# Patient Record
Sex: MALE | Race: ASIAN
[De-identification: names, ages, dates, MRNs, and addresses within clinical notes are randomized per-mention and may not be internally consistent; named-entity substitution may affect disease eponyms.]

---

## 2020-05-19 ENCOUNTER — HOSPITAL ENCOUNTER (OUTPATIENT)
Dept: HOSPITAL 46 - DS | Age: 45
Discharge: HOME | End: 2020-05-19
Attending: SURGERY
Payer: OTHER GOVERNMENT

## 2020-05-19 VITALS — HEIGHT: 65 IN | BODY MASS INDEX: 22.99 KG/M2 | WEIGHT: 138.01 LBS

## 2020-05-19 DIAGNOSIS — K42.9: Primary | ICD-10-CM

## 2020-05-19 DIAGNOSIS — Z79.899: ICD-10-CM

## 2020-05-19 DIAGNOSIS — K45.8: ICD-10-CM

## 2020-05-19 PROCEDURE — 0WQF0ZZ REPAIR ABDOMINAL WALL, OPEN APPROACH: ICD-10-PCS | Performed by: SURGERY

## 2020-05-19 PROCEDURE — C1781 MESH (IMPLANTABLE): HCPCS

## 2020-05-20 NOTE — OR
[facility]
                                    [address], [place]  [address]
_________________________________________________________________________________________
                                                                 Signed   
 
 
DATE OF OPERATION:
05/19/2020
 
SURGEON:
Carolyn Yap MD
 
PREOPERATIVE DIAGNOSIS:
Symptomatic umbilical hernia.
 
POSTOPERATIVE DIAGNOSIS:
Symptomatic umbilical hernia with herniated preperitoneal fat.
 
PROCEDURE:
Repair of umbilical hernia without implantation of mesh.
 
ANESTHESIA:
General endotracheal; Jeff Lyle CRNA and local 20 mL of 0.25% Marcaine with
epinephrine. 
 
INDICATIONS:
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
the periumbilical area.  He is a patient of MARI Pepper in [facility].  A CT
scan was performed under the direction of Mr. Segal, which showed herniation of fat
through an umbilical defect.  Clinical examination is rather nonspecific.  His pain is
episodic and significant when it occurs and located in the umbilical area.  He wishes to
proceed with hernia repair.  He understands the risks of bleeding, infection, and most
importantly failure to cure his symptoms.  He understands this, he wished to proceed. 
 
FINDINGS:
Indeed there was an umbilical defect with herniated preperitoneal fat.  Excision of the
fat was undertaken.  The defect was rather small at most 1.5 cm in size.  The fascial
edges were incised to allow for palpation in the preperitoneal space showing no sign of
other fascial defect.  Repair consisted of transverse reapproximation of the fascia with
interrupted 0 Prolene suture in a vertical mattress configuration.  Although, photos
were taken throughout the procedure, the card used to capture the photos, was unable to
print due to a defect in the card itself and therefore, there are no photos to document
the procedure unfortunately. 
 
DESCRIPTION OF PROCEDURE:
The patient was brought into the operating room, given a general endotracheal
anesthetic.  Preoperative antibiotic Ancef was given.  Sequential compression device
stockings were used and heparin subcutaneously administered.  The abdomen was clipped
 
    Electronically Signed By: CAROLYN YAP MD  05/20/20 9705
_________________________________________________________________________________________
PATIENT NAME:     VUU,LOC ALBA                          
MEDICAL RECORD #: [id number]            OPERATIVE REPORT              
          ACCT #: [id number]  
DATE OF BIRTH:   [birth year]            REPORT #: [id number]      
PHYSICIAN:        CAROLYN YAP MD                 
PCP:              NYASIA SARAVIA MD            
REPORT IS CONFIDENTIAL AND NOT TO BE RELEASED WITHOUT AUTHORIZATION
 
 
                                  [facility]
                                    [address], [place]  [address]
_________________________________________________________________________________________
                                                                 Signed   
 
 
and prepared with a chlorhexidine solution and draped sterilely.  A curvilinear incision
was made on the left side of the umbilicus.  Dissection carried through the dermis
sharply.  Electrocautery was used for hemostasis.  Dissection was carried through the
subcutaneous space, identifying herniated fat.  The fascial edges were easily defined
and the skin and so forth of the umbilicus were freed.  There appeared to be herniated
preperitoneal fat, which was not reducible particularly.  Noting that the fascial edge
was well defined, the defect looked to be about 1.5 cm at most.  The herniated
preperitoneal fat was excised.  The fascial edges were stout and strong.  The fascia was
incised laterally to the left, allowing for placement of index finger in the
preperitoneal space.  This allowed for determination that there were no other fascial
defects in the surrounding area.  There were none. 
 
Repair was deemed most appropriate with reapproximation of the fascia rather than
implantation of Prolene mesh given the small size of the defect.  The fascial layer was
reapproximated transversely with interrupted 0 Prolene suture in a vertical mattress
configuration.  A small defect at the base of the umbilicus skin was noted.  This was
secured with interrupted 2-0 Vicryl.  The depth of the dermis was then secured to the
fascia similarly.  A 20 mL of 0.25% Marcaine with epinephrine injected in the fascial
area and the subcutaneous tissue as well.  Marek's layer was reapproximated with
interrupted 2-0 Vicryl and the skin was closed with running subcuticular 3-0 Vicryl.
Steri-Strips were applied as was a silver sponge dressing with adhesive.  The patient
was extubated "deep" and transferred to the recovery room in good condition having
suffered no complications.  Sponge, needle, and counts reported as correct x3. 
 
 
 
            ________________________________________
            Carolyn Yap MD 
 
 
/MODL
Job #:  [id number]
DD:  05/19/2020 08:38:00
DT:  05/19/2020 09:52:22
 
cc:            MD Carolyn Sifuentes PA
 
 
Copies:  NYASIA SARAVIA MD
 
 
    Electronically Signed By: CAROLYN YAP MD  05/20/20 1146
_________________________________________________________________________________________
PATIENT NAME:     VUU,LOC ALBA                          
MEDICAL RECORD #: [id number]            OPERATIVE REPORT              
          ACCT #: [id number]  
DATE OF BIRTH:   [birth year]            REPORT #: [id number]      
PHYSICIAN:        CAROLYN YAP MD                 
PCP:              NYASIA SARAVIA MD            
REPORT IS CONFIDENTIAL AND NOT TO BE RELEASED WITHOUT AUTHORIZATION
 
 
                                  [address]  [address]
_________________________________________________________________________________________
                                                                 Signed   
 
 
         CAROLYN SEGAL
~
 
 
 
 
 
 
 
 
 
 
 
 
 
 
 
 
 
 
 
 
 
 
 
 
 
 
 
 
 
 
 
 
 
 
 
 
 
 
 
 
 
    Electronically Signed By: CAROLYN YAP MD  05/20/20 1146
_________________________________________________________________________________________
PATIENT NAME:     VUU,LOC ALBA                          
MEDICAL RECORD #: [id number]            OPERATIVE REPORT              
          ACCT #: [id number]  
DATE OF BIRTH:   [birth year]            REPORT #: [id number]      
PHYSICIAN:        CAROLYN YAP MD                 
PCP:              NYASIA SARAVIA MD            
REPORT IS CONFIDENTIAL AND NOT TO BE RELEASED WITHOUT AUTHORIZATION